# Patient Record
Sex: FEMALE
[De-identification: names, ages, dates, MRNs, and addresses within clinical notes are randomized per-mention and may not be internally consistent; named-entity substitution may affect disease eponyms.]

---

## 2020-04-02 ENCOUNTER — APPOINTMENT (OUTPATIENT)
Dept: UROLOGY | Facility: CLINIC | Age: 55
End: 2020-04-02

## 2020-05-26 ENCOUNTER — APPOINTMENT (OUTPATIENT)
Dept: UROLOGY | Facility: CLINIC | Age: 55
End: 2020-05-26
Payer: COMMERCIAL

## 2020-05-26 VITALS
OXYGEN SATURATION: 100 % | BODY MASS INDEX: 22.73 KG/M2 | HEIGHT: 68 IN | HEART RATE: 87 BPM | DIASTOLIC BLOOD PRESSURE: 73 MMHG | WEIGHT: 150 LBS | SYSTOLIC BLOOD PRESSURE: 115 MMHG | TEMPERATURE: 98.1 F

## 2020-05-26 DIAGNOSIS — Z00.00 ENCOUNTER FOR GENERAL ADULT MEDICAL EXAMINATION W/OUT ABNORMAL FINDINGS: ICD-10-CM

## 2020-05-26 LAB
BILIRUB UR QL STRIP: NORMAL
CLARITY UR: CLEAR
COLLECTION METHOD: NORMAL
GLUCOSE UR-MCNC: NORMAL
HCG UR QL: 0.2 EU/DL
HGB UR QL STRIP.AUTO: NORMAL
KETONES UR-MCNC: NORMAL
LEUKOCYTE ESTERASE UR QL STRIP: NORMAL
NITRITE UR QL STRIP: NORMAL
PH UR STRIP: 6.5
PROT UR STRIP-MCNC: NORMAL
SP GR UR STRIP: 1.02

## 2020-05-26 PROCEDURE — 99215 OFFICE O/P EST HI 40 MIN: CPT

## 2020-05-26 PROCEDURE — 81003 URINALYSIS AUTO W/O SCOPE: CPT | Mod: QW

## 2020-05-28 LAB — BACTERIA UR CULT: NORMAL

## 2020-05-28 NOTE — ASSESSMENT
[FreeTextEntry1] : 54 year old female s/p right robotic pyeloplasty 2013.  She would like to undergo a repeat renal US and renal scan given her symptoms.  I have sent her urine for culture.

## 2020-05-28 NOTE — HISTORY OF PRESENT ILLNESS
[FreeTextEntry1] : Pt is a 53 year old female s/p right robotic pyeloplasty 2013, (MDD) who presents for annual follow-up.  she underwent a renal scan in 2019 as well as a renal US which showed no change in slight asymmetric renal function and no high grade obstruction.  \par \par She occasionally has right flank discomfort, (once a week).  She has no fever or chills. \par She has been sheltering at home in the setting of COVID19.  \par

## 2020-05-28 NOTE — PHYSICAL EXAM
[General Appearance - Well Developed] : well developed [General Appearance - Well Nourished] : well nourished [Well Groomed] : well groomed [Normal Appearance] : normal appearance [Abdomen Soft] : soft [General Appearance - In No Acute Distress] : no acute distress [Abdomen Tenderness] : non-tender [Costovertebral Angle Tenderness] : no ~M costovertebral angle tenderness [Urinary Bladder Findings] : the bladder was normal on palpation [] : no respiratory distress [Edema] : no peripheral edema [Respiration, Rhythm And Depth] : normal respiratory rhythm and effort [Oriented To Time, Place, And Person] : oriented to person, place, and time [Exaggerated Use Of Accessory Muscles For Inspiration] : no accessory muscle use [Mood] : the mood was normal [Affect] : the affect was normal [Not Anxious] : not anxious [No Focal Deficits] : no focal deficits [Normal Station and Gait] : the gait and station were normal for the patient's age [No Palpable Adenopathy] : no palpable adenopathy

## 2020-06-03 ENCOUNTER — APPOINTMENT (OUTPATIENT)
Dept: UROLOGY | Facility: CLINIC | Age: 55
End: 2020-06-03
Payer: COMMERCIAL

## 2020-06-03 VITALS — HEART RATE: 89 BPM | SYSTOLIC BLOOD PRESSURE: 106 MMHG | DIASTOLIC BLOOD PRESSURE: 68 MMHG | TEMPERATURE: 97.8 F

## 2020-06-03 LAB
BILIRUB UR QL STRIP: NORMAL
CLARITY UR: CLEAR
COLLECTION METHOD: NORMAL
GLUCOSE UR-MCNC: NORMAL
HCG UR QL: 0.2 EU/DL
HGB UR QL STRIP.AUTO: NORMAL
KETONES UR-MCNC: NORMAL
LEUKOCYTE ESTERASE UR QL STRIP: NORMAL
NITRITE UR QL STRIP: NORMAL
PH UR STRIP: 7.5
PROT UR STRIP-MCNC: NORMAL
SP GR UR STRIP: 1.02

## 2020-06-03 PROCEDURE — 81003 URINALYSIS AUTO W/O SCOPE: CPT | Mod: QW

## 2020-06-03 PROCEDURE — 51701 INSERT BLADDER CATHETER: CPT

## 2020-06-03 PROCEDURE — 99214 OFFICE O/P EST MOD 30 MIN: CPT | Mod: 25

## 2020-06-03 NOTE — HISTORY OF PRESENT ILLNESS
[FreeTextEntry1] : Pt is a 53 year old female s/p right robotic pyeloplasty 2013, (MDD) who presented for annual follow-up 1 week ago complaining of mild urinary symptoms and occasional right flank pain.  She underwent a renal scan in 2019 as well as a renal US which showed no change in slight asymmetric renal function and no high grade obstruction.  \par \par She has no fever or chills. \par She has been sheltering at home in the setting of COVID19.  \par \par Her urine culture last week was contaminated.  She also reports a vaginal odor which is new.  \par

## 2020-06-03 NOTE — LETTER BODY
[Dear  ___] : Dear  [unfilled], [Consult Letter:] : I had the pleasure of evaluating your patient, [unfilled]. [Please see my note below.] : Please see my note below. [Consult Closing:] : Thank you very much for allowing me to participate in the care of this patient.  If you have any questions, please do not hesitate to contact me. [Sincerely,] : Sincerely, [FreeTextEntry3] : Dr. Yeny Rucker\par

## 2020-06-03 NOTE — ASSESSMENT
[FreeTextEntry1] : Straight cath urine specimen sent as well as vaginal culture.   Pt to follow up for results as well as renal scan and US results.

## 2020-06-03 NOTE — PHYSICAL EXAM
[General Appearance - Well Nourished] : well nourished [Normal Appearance] : normal appearance [General Appearance - Well Developed] : well developed [Well Groomed] : well groomed [General Appearance - In No Acute Distress] : no acute distress [Abdomen Tenderness] : non-tender [Abdomen Soft] : soft [Costovertebral Angle Tenderness] : no ~M costovertebral angle tenderness [Urinary Bladder Findings] : the bladder was normal on palpation [Edema] : no peripheral edema [Respiration, Rhythm And Depth] : normal respiratory rhythm and effort [] : no respiratory distress [Oriented To Time, Place, And Person] : oriented to person, place, and time [Exaggerated Use Of Accessory Muscles For Inspiration] : no accessory muscle use [Affect] : the affect was normal [Mood] : the mood was normal [Not Anxious] : not anxious [Normal Station and Gait] : the gait and station were normal for the patient's age [No Focal Deficits] : no focal deficits [No Palpable Adenopathy] : no palpable adenopathy

## 2020-06-05 LAB — BACTERIA UR CULT: NORMAL

## 2020-06-08 LAB — BACTERIA GENITAL AEROBE CULT: NORMAL

## 2021-09-30 ENCOUNTER — NON-APPOINTMENT (OUTPATIENT)
Age: 56
End: 2021-09-30

## 2021-09-30 ENCOUNTER — APPOINTMENT (OUTPATIENT)
Dept: UROLOGY | Facility: CLINIC | Age: 56
End: 2021-09-30
Payer: COMMERCIAL

## 2021-09-30 VITALS
OXYGEN SATURATION: 100 % | SYSTOLIC BLOOD PRESSURE: 118 MMHG | HEART RATE: 76 BPM | DIASTOLIC BLOOD PRESSURE: 65 MMHG | TEMPERATURE: 98.3 F

## 2021-09-30 DIAGNOSIS — Q62.11 CONGENITAL OCCLUSION OF URETEROPELVIC JUNCTION: ICD-10-CM

## 2021-09-30 LAB
BILIRUB UR QL STRIP: NORMAL
CLARITY UR: CLEAR
COLLECTION METHOD: NORMAL
GLUCOSE UR-MCNC: NORMAL
HCG UR QL: 0.2 EU/DL
HGB UR QL STRIP.AUTO: NORMAL
KETONES UR-MCNC: NORMAL
LEUKOCYTE ESTERASE UR QL STRIP: NORMAL
NITRITE UR QL STRIP: NORMAL
PH UR STRIP: 7
PROT UR STRIP-MCNC: NORMAL
SP GR UR STRIP: 1.02

## 2021-09-30 PROCEDURE — 99213 OFFICE O/P EST LOW 20 MIN: CPT

## 2021-10-07 NOTE — HISTORY OF PRESENT ILLNESS
[FreeTextEntry1] : Pt is a 53 year old female s/p right robotic pyeloplasty 2013, (MDD) who presented for annual follow-up.  She reports feeling great, and denies any back and bladder symptoms. \par \par Udip: (+) small blood, (-) navjot. \par   \par Full Hx: \par 6-30/21--Pt is a 53 year old female s/p right robotic pyeloplasty 2013, (MDD) who presented for annual follow-up 1 week ago complaining of mild urinary symptoms and occasional right flank pain.  She underwent a renal scan in 2019 as well as a renal US which showed no change in slight asymmetric renal function and no high grade obstruction.  \par She has no fever or chills. \par

## 2021-10-07 NOTE — ASSESSMENT
[FreeTextEntry1] : Pt is s/p right robotic pyeloplasty, doing well with no recent symptoms and no UTI's. Per the patients request, I have ordered a repeat renal US and renal scan and I will be in touch when I have the results.

## 2021-10-07 NOTE — ADDENDUM
[FreeTextEntry1] : A portion of this note was written by [Darya Taylor] on 09/30/2021 acting as a scribe for Dr. Rucker. \par \par I have personally reviewed the chart and agree that the record accurately reflects my personal performance of the history, physical exam, assessment, and plan.

## 2022-01-04 ENCOUNTER — NON-APPOINTMENT (OUTPATIENT)
Age: 57
End: 2022-01-04

## 2022-09-27 ENCOUNTER — APPOINTMENT (OUTPATIENT)
Dept: UROLOGY | Facility: CLINIC | Age: 57
End: 2022-09-27

## 2022-09-27 VITALS
DIASTOLIC BLOOD PRESSURE: 81 MMHG | OXYGEN SATURATION: 96 % | SYSTOLIC BLOOD PRESSURE: 126 MMHG | TEMPERATURE: 97.3 F | RESPIRATION RATE: 16 BRPM | HEART RATE: 74 BPM

## 2022-09-27 PROCEDURE — 99214 OFFICE O/P EST MOD 30 MIN: CPT

## 2022-09-29 NOTE — ASSESSMENT
[FreeTextEntry1] : Pt is a 56 yo F s/p R robotic pyeloplastic (2013) who presents for an annual follow-up. Renal US from 12/2/2021 showed no obstruction. Pt reports she feels great, no recent symptoms and no UTIs. \par \par Per pt request she will undergo a renal US to monitor for hydronephrosis/stones, and will schedule a follow-up to discuss results. \par \par Patient expressed understanding.\par

## 2022-09-29 NOTE — HISTORY OF PRESENT ILLNESS
[FreeTextEntry1] : Pt is a 56 yo F s/p R robotic pyeloplastic (2013) who presents for an annual follow-up. Renal US from 12/2/2021 showed no obstruction. Pt reports she feels great, denies experiencing any flank/back pain other than some lower back pain that she attributes to a workout routine. She has not had any UTIs in the past year. 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 previous small nonobstructing R mid renal calculus not seen on current study.\par \par 9/30/2021-- Pt is a 53 year old female s/p right robotic pyeloplasty 2013, (MDD) who presented for annual follow-up.  She reports feeling great, and denies any back and bladder symptoms. \par \par Udip: (+) small blood, (-) navjot. \par   \par Full Hx: \par 6-30/21--Pt is a 53 year old female s/p right robotic pyeloplasty 2013, (MDD) who presented for annual follow-up 1 week ago complaining of mild urinary symptoms and occasional right flank pain.  She underwent a renal scan in 2019 as well as a renal US which showed no change in slight asymmetric renal function and no high grade obstruction.  \par She has no fever or chills. \par

## 2022-09-29 NOTE — ADDENDUM
[FreeTextEntry1] : A portion of this note was written by [Cortney Silveira] on 09/27/2022  acting as a scribe for Dr. Rucker. \par \par I have personally reviewed the chart and agree that the record accurately reflects my personal performance and the history, physical exam, assessment, and plan.\par

## 2023-03-16 ENCOUNTER — APPOINTMENT (OUTPATIENT)
Dept: UROLOGY | Facility: CLINIC | Age: 58
End: 2023-03-16
Payer: COMMERCIAL

## 2023-03-16 PROCEDURE — 99212 OFFICE O/P EST SF 10 MIN: CPT | Mod: 95

## 2023-03-31 NOTE — HISTORY OF PRESENT ILLNESS
[Other Location: e.g. School (Enter Location, City,State)___] : at [unfilled], at the time of the visit. [Medical Office: ([facility])___] : at the medical office located in  [Verbal consent obtained from patient] : the patient, [unfilled] [FreeTextEntry1] : Ms. MARISELA STORY comes in today for her urologic follow-up. Ms. 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 Alfredo Carter. She is doing very well since the procedure and denies gross hematuria or flank pain. \par \par Ms. Story was advised to have a breast MRI by another physician and would like to know if there will be any deleterious effects on her kidneys due to the MRI or the contrast used for the procedure. \par \par NM Renal Scan: 6/4/20--No significant interval change. Normal left kidney. 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 Renal US from 12/2/2021 showed no obstruction. Pt reports she feels great, denies experiencing any flank/back pain other than some lower back pain that she attributes to a workout routine. She has not had any UTIs in the past year. 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 previous small nonobstructing R mid renal calculus not seen on current study.\par \par 9/30/2021-- Pt is a 53 year old female s/p right robotic pyeloplasty 2013, (MDD) who presented for annual follow-up.  She reports feeling great, and denies any back and bladder symptoms. \par \par Udip: (+) small blood, (-) navjot. \par   \par Full Hx: \par 6-30/21--Pt is a 53 year old female s/p right robotic pyeloplasty 2013, (MDD) who presented for annual follow-up 1 week ago complaining of mild urinary symptoms and occasional right flank pain.  She underwent a renal scan in 2019 as well as a renal US which showed no change in slight asymmetric renal function and no high grade obstruction.  \par She has no fever or chills. \par

## 2023-03-31 NOTE — ADDENDUM
[FreeTextEntry1] : A portion of this note was written by [Carson Sandoval] on 03/16/2023 acting as a scribe for Dr. Rucker. \par \par I have personally reviewed the chart and agree that the record accurately reflects my personal performance of the history, physical exam, assessment, and plan.

## 2023-03-31 NOTE — ASSESSMENT
[FreeTextEntry1] : I discussed the findings and options with Ms. MARISELA STORY in detail.\par \par I reassured Ms. Story that she can safely proceed with the breast MRI without compromising the prior surgical repair of her right ureteropelvic junction. She will be having blood tests with her Internist in the next several days; I have asked that she have her serum creatinine results forwarded to me for my review.\par \par All of her questions were answered.

## 2024-11-20 ENCOUNTER — APPOINTMENT (OUTPATIENT)
Dept: UROLOGY | Facility: CLINIC | Age: 59
End: 2024-11-20
Payer: COMMERCIAL

## 2024-11-20 VITALS
DIASTOLIC BLOOD PRESSURE: 77 MMHG | WEIGHT: 147 LBS | TEMPERATURE: 97.6 F | OXYGEN SATURATION: 99 % | SYSTOLIC BLOOD PRESSURE: 115 MMHG | HEART RATE: 83 BPM | BODY MASS INDEX: 22.35 KG/M2

## 2024-11-20 PROCEDURE — 81003 URINALYSIS AUTO W/O SCOPE: CPT | Mod: QW

## 2024-11-20 PROCEDURE — 99215 OFFICE O/P EST HI 40 MIN: CPT | Mod: 25

## 2024-11-21 LAB
BILIRUB UR QL STRIP: NORMAL
CLARITY UR: CLEAR
COLLECTION METHOD: NORMAL
GLUCOSE UR-MCNC: NORMAL
HCG UR QL: 0.2 EU/DL
HGB UR QL STRIP.AUTO: NORMAL
KETONES UR-MCNC: NORMAL
LEUKOCYTE ESTERASE UR QL STRIP: NORMAL
NITRITE UR QL STRIP: NORMAL
PH UR STRIP: 6.5
PROT UR STRIP-MCNC: 100
SP GR UR STRIP: 1.02